# Patient Record
Sex: MALE | Race: WHITE | NOT HISPANIC OR LATINO | Employment: OTHER | URBAN - METROPOLITAN AREA
[De-identification: names, ages, dates, MRNs, and addresses within clinical notes are randomized per-mention and may not be internally consistent; named-entity substitution may affect disease eponyms.]

---

## 2024-08-16 ENCOUNTER — OFFICE VISIT (OUTPATIENT)
Dept: URGENT CARE | Facility: CLINIC | Age: 74
End: 2024-08-16
Payer: MEDICARE

## 2024-08-16 VITALS
RESPIRATION RATE: 20 BRPM | WEIGHT: 242 LBS | OXYGEN SATURATION: 98 % | TEMPERATURE: 98 F | SYSTOLIC BLOOD PRESSURE: 124 MMHG | DIASTOLIC BLOOD PRESSURE: 70 MMHG | HEART RATE: 54 BPM

## 2024-08-16 DIAGNOSIS — S81.812A LACERATION OF LEFT LOWER LEG, INITIAL ENCOUNTER: Primary | ICD-10-CM

## 2024-08-16 PROCEDURE — 12002 RPR S/N/AX/GEN/TRNK2.6-7.5CM: CPT | Performed by: NURSE PRACTITIONER

## 2024-08-16 PROCEDURE — 99214 OFFICE O/P EST MOD 30 MIN: CPT | Performed by: NURSE PRACTITIONER

## 2024-08-16 PROCEDURE — 12032 INTMD RPR S/A/T/EXT 2.6-7.5: CPT | Performed by: NURSE PRACTITIONER

## 2024-08-16 PROCEDURE — G0463 HOSPITAL OUTPT CLINIC VISIT: HCPCS | Performed by: NURSE PRACTITIONER

## 2024-08-16 RX ORDER — LISINOPRIL/HYDROCHLOROTHIAZIDE 10-12.5 MG
1 TABLET ORAL DAILY
COMMUNITY

## 2024-08-16 RX ORDER — CEPHALEXIN 500 MG/1
500 CAPSULE ORAL EVERY 12 HOURS SCHEDULED
Qty: 14 CAPSULE | Refills: 0 | Status: SHIPPED | OUTPATIENT
Start: 2024-08-16 | End: 2024-08-23

## 2024-08-16 NOTE — PATIENT INSTRUCTIONS
You have 9 sutures in your left lower leg. There will be some open skin that wasn't able to be sutured.  You are to keep clean and dry as much as possible.  Apply plain bacitracin to the wound x 3 days only then stop.   You are to have the sutures removed in 7-10 days.  Closer to 10 if possible.    Take the cephalexin as prescribed.  Follow up with your PCP in 3-5 days  Go to the ED if symptoms worsen

## 2024-08-16 NOTE — PROGRESS NOTES
"  [facility]        NAME: Celio Maier is a 73 y.o. male  : 1950    MRN: [id number]  DATE: 2024  TIME: 1:13 PM    Assessment and Plan   Laceration of left lower leg, initial encounter [S81.812A]  1. Laceration of left lower leg, initial encounter  cephalexin (KEFLEX) 500 mg capsule            Patient Instructions       Follow up with PCP in 3-5 days.  Proceed to  ER if symptoms worsen.    If tests have been performed at [facility], our office will contact you with results if changes need to be made to the care plan discussed with you at the visit.  You can [facility] full [facility]    You have 9 sutures in your left lower leg. There will be some open skin that wasn't able to be sutured.  You are to keep clean and dry as much as possible.  Apply plain bacitracin to the wound x 3 days only then stop.   You are to have the sutures removed in 7-10 days.  Closer to 10 if possible.    Take the cephalexin as prescribed.  Follow up with your PCP in 3-5 days  Go to the ED if symptoms worsen         Chief Complaint     Chief Complaint   Patient presents with    Laceration     Cut left lower leg when walked by lawn mower          History of Present Illness       This is a 73 year old male who states walked by his lawn mower and caught his left lower leg on the lawn mower bolt and no has a laceration to the left lower lateral leg.  States Td is UTD.    Pt states if he has to have sutures \"I am not coming back to have them removed\".  Wife states they have a family member than can take the sutures out.  Pt was instructed that is up to him as to what he wants to do.    PMH is listed     Laceration         Review of Systems   Review of Systems   Constitutional: Negative.    HENT: Negative.     Eyes: Negative.    Respiratory: Negative.     Cardiovascular: Negative.    Gastrointestinal: Negative.    Endocrine: Negative.    Genitourinary: Negative.    Musculoskeletal: Negative.  "   Skin:  Positive for wound.   Allergic/Immunologic: Negative.    Neurological: Negative.    Hematological: Negative.    Psychiatric/Behavioral: Negative.           Current Medications       Current Outpatient Medications:     cephalexin (KEFLEX) 500 mg capsule, Take 1 capsule (500 mg total) by mouth every 12 (twelve) hours for 7 days, Disp: 14 capsule, Rfl: 0    lisinopril-hydrochlorothiazide (PRINZIDE,ZESTORETIC) 10-12.5 MG per tablet, Take 1 tablet by mouth daily, Disp: , Rfl:     Current Allergies     Allergies as of 08/16/2024    (No Known Allergies)            The following portions of the patient's history were reviewed and updated as appropriate: allergies, current medications, past family history, past medical history, past social history, past surgical history and problem list.     Past Medical History:   Diagnosis Date    Hypertension        History reviewed. No pertinent surgical history.    History reviewed. No pertinent family history.      Medications have been verified.        Objective   /70   Pulse (!) 54   Temp 98 °F (36.7 °C)   Resp 20   Wt 110 kg (242 lb)   SpO2 98%   No LMP for male patient.       Physical Exam     Physical Exam  Vitals and nursing note reviewed.   Constitutional:       General: He is not in acute distress.     Appearance: Normal appearance. He is normal weight. He is not ill-appearing, toxic-appearing or diaphoretic.   HENT:      Head: Normocephalic and atraumatic.   Eyes:      Extraocular Movements: Extraocular movements intact.   Cardiovascular:      Rate and Rhythm: Normal rate.      Pulses: Normal pulses.   Pulmonary:      Effort: Pulmonary effort is normal.   Musculoskeletal:         General: Normal range of motion.      Cervical back: Normal range of motion.   Skin:     General: Skin is warm and dry.      Capillary Refill: Capillary refill takes less than 2 seconds.          Neurological:      General: No focal deficit present.      Mental Status: He is alert  "and oriented to person, place, and time.   Psychiatric:         Mood and Affect: Mood normal.         Behavior: Behavior normal.         Thought Content: Thought content normal.         Judgment: Judgment normal.           Universal Protocol:  procedure performed by consultantConsent: The procedure was performed in an emergent situation. Verbal consent obtained. Written consent obtained.  Risks and benefits: risks, benefits and alternatives were discussed  Consent given by: patient  Time out: Immediately prior to procedure a \"time out\" was called to verify the correct patient, procedure, equipment, support staff and site/side marked as required.  Timeout called at: 8/16/2024 12:45 PM.  Patient understanding: patient states understanding of the procedure being performed  Patient consent: the patient's understanding of the procedure matches consent given  Procedure consent: procedure consent matches procedure scheduled  Relevant documents: relevant documents present and verified  Test results: test results available and properly labeled  Site marked: the operative site was marked  Required items: required blood products, implants, devices, and special equipment available  Patient identity confirmed: verbally with patient and provided demographic data  Laceration repair    Date/Time: 8/16/2024 12:45 PM    Performed by: MARIANELA Goodrich  Authorized by: MARIANELA Goodrich  Body area: lower extremity  Location details: left lower leg  Laceration length: 4 cm  Foreign bodies: no foreign bodies  Anesthesia: local infiltration    Anesthesia:  Anesthetic total: 10 mL    Sedation:  Patient sedated: no      Wound Dehiscence:  Superficial Wound Dehiscence: simple closure      Procedure Details:  Preparation: Patient was prepped and draped in the usual sterile fashion.  Irrigation solution: saline (NSS)  Irrigation method: syringe  Amount of cleaning: standard  Skin closure: 4-0 nylon  Number of sutures: " 9  Technique: knot tying  Approximation: close  Approximation difficulty: simple  Dressing: antibiotic ointment, 4x4 sterile gauze and gauze roll  Patient tolerance: patient tolerated the procedure well with no immediate complications  Comments: At medial aspect of V shape laceration there is skin tear with skin missing - unable to repair. Left open and placed bacitracin on the wound.